# Patient Record
Sex: MALE | Race: WHITE | Employment: FULL TIME | URBAN - METROPOLITAN AREA
[De-identification: names, ages, dates, MRNs, and addresses within clinical notes are randomized per-mention and may not be internally consistent; named-entity substitution may affect disease eponyms.]

---

## 2022-04-04 ENCOUNTER — HOSPITAL ENCOUNTER (EMERGENCY)
Age: 27
Discharge: HOME | End: 2022-04-04

## 2022-04-04 ENCOUNTER — APPOINTMENT (OUTPATIENT)
Dept: GENERAL RADIOLOGY | Age: 27
End: 2022-04-04

## 2022-04-04 VITALS
RESPIRATION RATE: 19 BRPM | WEIGHT: 250 LBS | BODY MASS INDEX: 35 KG/M2 | DIASTOLIC BLOOD PRESSURE: 93 MMHG | HEIGHT: 71 IN | HEART RATE: 96 BPM | OXYGEN SATURATION: 95 % | TEMPERATURE: 99 F | SYSTOLIC BLOOD PRESSURE: 178 MMHG

## 2022-04-04 DIAGNOSIS — S92.532B OPEN DISPLACED FRACTURE OF DISTAL PHALANX OF LESSER TOE OF LEFT FOOT, INITIAL ENCOUNTER: ICD-10-CM

## 2022-04-04 DIAGNOSIS — S91.209A OPEN WOUND OF TOE WITH AVULSION OF TOENAIL, INITIAL ENCOUNTER: Primary | ICD-10-CM

## 2022-04-04 PROCEDURE — 73630 X-RAY EXAM OF FOOT: CPT

## 2022-04-04 PROCEDURE — 99283 EMERGENCY DEPT VISIT LOW MDM: CPT

## 2022-04-04 RX ORDER — LIDOCAINE HYDROCHLORIDE 10 MG/ML
5 INJECTION, SOLUTION EPIDURAL; INFILTRATION; INTRACAUDAL; PERINEURAL ONCE
Status: DISCONTINUED | OUTPATIENT
Start: 2022-04-04 | End: 2022-04-04 | Stop reason: HOSPADM

## 2022-04-04 RX ORDER — CEPHALEXIN 500 MG/1
500 CAPSULE ORAL 4 TIMES DAILY
Qty: 28 CAPSULE | Refills: 0 | Status: SHIPPED | OUTPATIENT
Start: 2022-04-04 | End: 2022-04-11

## 2022-04-04 RX ORDER — ACETAMINOPHEN AND CODEINE PHOSPHATE 300; 30 MG/1; MG/1
1 TABLET ORAL 3 TIMES DAILY PRN
Qty: 10 TABLET | Refills: 0 | Status: SHIPPED | OUTPATIENT
Start: 2022-04-04 | End: 2022-04-07

## 2022-04-04 RX ORDER — BUPIVACAINE HYDROCHLORIDE 5 MG/ML
INJECTION, SOLUTION EPIDURAL; INTRACAUDAL
Status: DISCONTINUED
Start: 2022-04-04 | End: 2022-04-04 | Stop reason: HOSPADM

## 2022-04-04 RX ORDER — BUPIVACAINE HYDROCHLORIDE 5 MG/ML
10 INJECTION, SOLUTION EPIDURAL; INTRACAUDAL ONCE
Status: DISCONTINUED | OUTPATIENT
Start: 2022-04-04 | End: 2022-04-04 | Stop reason: HOSPADM

## 2022-04-04 ASSESSMENT — ENCOUNTER SYMPTOMS
VOMITING: 0
NAUSEA: 0
DIARRHEA: 0
ABDOMINAL PAIN: 0
COUGH: 0
COLOR CHANGE: 0
SHORTNESS OF BREATH: 0
BACK PAIN: 0

## 2022-04-04 ASSESSMENT — PAIN DESCRIPTION - ONSET: ONSET: SUDDEN

## 2022-04-04 ASSESSMENT — PAIN DESCRIPTION - DESCRIPTORS: DESCRIPTORS: ACHING;SHARP

## 2022-04-04 ASSESSMENT — PAIN DESCRIPTION - LOCATION: LOCATION: FOOT

## 2022-04-04 ASSESSMENT — PAIN - FUNCTIONAL ASSESSMENT: PAIN_FUNCTIONAL_ASSESSMENT: PREVENTS OR INTERFERES SOME ACTIVE ACTIVITIES AND ADLS

## 2022-04-04 ASSESSMENT — PAIN DESCRIPTION - PAIN TYPE: TYPE: ACUTE PAIN

## 2022-04-04 ASSESSMENT — PAIN DESCRIPTION - FREQUENCY: FREQUENCY: CONTINUOUS

## 2022-04-04 ASSESSMENT — PAIN SCALES - GENERAL: PAINLEVEL_OUTOF10: 10

## 2022-04-04 ASSESSMENT — PAIN DESCRIPTION - ORIENTATION: ORIENTATION: LEFT

## 2022-04-04 ASSESSMENT — PAIN DESCRIPTION - PROGRESSION: CLINICAL_PROGRESSION: NOT CHANGED

## 2022-04-04 NOTE — ED PROVIDER NOTES
**ADVANCED PRACTICE PROVIDER, I HAVE EVALUATED THIS PATIENT**        [address] ENCOUNTER      Pt Name: Jerry Stokes  CTE:[id number]  [place] 1995  Date of evaluation: 4/4/2022  Provider: JONY Esparza - GEORGE      Chief Complaint:    Chief Complaint   Patient presents with    Toe Injury     Pt states large piece of conrete was dropped on top of left foot, 2nd and 3rd toes were smashed and have bleeding noted. Nursing Notes, Past Medical Hx, Past Surgical Hx, Social Hx, Allergies, and Family Hx were all reviewed and agreed with or any disagreements were addressed in the HPI.    HPI: (Location, Duration, Timing, Severity, Quality, Assoc Sx, Context, Modifying factors)    Chief Complaint of toe pain after dropping concrete on them at work    This is a  27 y.o. male who presents today with laceration to both his second and third toe on his left foot, states that he dropped a piece of concrete on it. Has 2 open wounds on both the second and third toe of the left foot, adjacent to both cuticles, it appears the nails are avulsed with a questionable open fracture.,  Patient complains of severe pain, rates the pain a 10 on a 10. He denies any pain in the actual foot. No numbness, no paresthesias. He does report his tetanus is up-to-date. He denies any distal complaints, no additional aggravating or leading factors. Patient presents awake, alert and in no acute respiratory distress or toxic appearance. PastMedical/Surgical History:  History reviewed. No pertinent past medical history. Procedure Laterality Date    APPENDECTOMY         Medications:  Discharge Medication List as of 4/4/2022  2:02 PM            Review of Systems:  (2-9 systems needed)  Review of Systems   Constitutional: Negative for chills and fever. HENT: Negative for congestion. Respiratory: Negative for cough and shortness of breath.     Cardiovascular: Negative for chest pain. Gastrointestinal: Negative for abdominal pain, diarrhea, nausea and vomiting. Musculoskeletal: Positive for arthralgias. Negative for back pain. Patient has what appears to be 2 open wounds on both the second and third toe of the left foot, adjacent to both cuticles, it appears the nails are avulsed with a questionable open fracture. Skin: Positive for wound. Negative for color change. Patient presents with laceration to both his second and third toe on his left foot, states that he dropped a piece of concrete on it. Neurological: Negative for weakness, numbness and headaches. \"Positives and Pertinent negatives as per HPI\"    Physical Exam:  Physical Exam  Vitals and nursing note reviewed. Constitutional:       Appearance: He is well-developed. He is not diaphoretic. HENT:      Head: Normocephalic. Right Ear: External ear normal.      Left Ear: External ear normal.   Eyes:      General:         Right eye: No discharge. Left eye: No discharge. Cardiovascular:      Rate and Rhythm: Normal rate. Pulmonary:      Effort: Pulmonary effort is normal. No respiratory distress. Breath sounds: Normal breath sounds. Musculoskeletal:         General: Tenderness present. Normal range of motion. Cervical back: Normal range of motion and neck supple. Comments: Patient has severe tenderness about the second and third toe, appears to have an avulsed toenail on both   Skin:     General: Skin is warm. Capillary Refill: Capillary refill takes less than 2 seconds. Coloration: Skin is not pale. Comments: Patient has what appears to be 2 open wounds on both the second and third toe of the left foot, adjacent to both cuticles, it appears the nails are avulsed with a questionable open fracture. Neurological:      General: No focal deficit present. Mental Status: He is alert and oriented to person, place, and time.    Psychiatric: Behavior: Behavior normal.         MEDICAL DECISION MAKING    Vitals:    Vitals:    04/04/22 1155   BP: (!) 178/93   Pulse: 96   Resp: 19   Temp: 99 °F (37.2 °C)   TempSrc: Oral   SpO2: 95%   Weight: 250 lb (113.4 kg)   Height: 5' 11\" (1.803 m)       LABS:Labs Reviewed - No data to display     Remainder of labs reviewed and were negative at this time or not returned at the time of this note. RADIOLOGY:   Non-plain film images such as CT, Ultrasound and MRI are read by the radiologist. Sylvia DEL VALLE APRN - CNP have directly visualized the radiologic plain film image(s) with the below findings:      Interpretation per the Radiologist below, if available at the time of this note:    XR FOOT LEFT (MIN 3 VIEWS)   Final Result   Traumatic, acute fractures of the 2nd and 3rd von. MEDICAL DECISION MAKING / ED COURSE:    Because of high probability of sudden clinical deterioration of the patient's condition and risk of further deterioration, critical care time required my full attention to the patient's condition; which included chart data review, documentation, medication ordering, reviewing the patient's old records, reevaluation patient's cardiac, pulmonary and neurological status. Reevaluation of vital signs. Consultations with ED attending and admitting physician. Ordering, interpreting reviewing diagnostic testing. Therefore a critical care time was 12 minutes of direct attention to the patient's condition did not include time spent on procedures. PROCEDURES:   Procedures    Laceration Repair Procedure Note    Indication: two toenail removals with concern of laceration    Procedure: The patient was placed in the appropriate position and anesthesia around the 2nd and 3rd toe obtained with a full digital block of the left second toe and third toe using 0.5% Bupivacaine without epinephrine.  The area was then cleansed with Shur-Clens and draped in a sterile fashion and irrigated with Shiraur-Clens and normal saline. However, the toenails were easily removed, I looked thoroughly and there is no open lacerations under the toenail, it appears that he avulsed both nails and directly at the cuticle. No laceration repair was required only toenail removal.    Other Items: None    The patient tolerated the procedure well. Complications: None        Patient was given:  Medications   bupivacaine (PF) (MARCAINE) 0.5 % injection 50 mg (has no administration in time range)   lidocaine PF 1 % injection 5 mL (has no administration in time range)   bupivacaine (PF) (MARCAINE) 0.5 % injection (has no administration in time range)       Patient presents with laceration to both his second and third toe on his left foot, states that he dropped a piece of concrete on it. Patient has what appears to be 2 open wounds on both the second and third toe of the left foot, adjacent to both cuticles, it appears the nails are avulsed with a questionable open fracture. After evaluation and examination patient wound care was completed, x-rays are obtained, I immediately did a digital block to the second and third toe due to the obvious open injury and avulsed toenails. X-ray shows traumatic acute fractures of the second and third tops of both toenails, comminuted with adjacent soft tissue swelling. I did perform a procedure removing both toenails however underneath there is no visible lacerations, it appears that the mechanism injury occurred adjacent to the cuticle, the toenail was removed with ease, patient was placed in postop shoe however prior Surgifoam was applied along with a dressing. His tetanus was up-to-date, I will start him on antibiotic therapy. I estimate there is LOW risk for COMPARTMENT SYNDROME, DEEP VENOUS THROMBOSIS, SEPTIC ARTHRITIS, TENDON OR NEUROVASCULAR INJURY, thus I consider the discharge disposition reasonable.       Therefore, Shared medical decision was made to the patient myself we agreed

## 2022-04-04 NOTE — ED TRIAGE NOTES
Pt states large piece of conrete was dropped on top of left foot, 2nd and 3rd toes were smashed and have bleeding noted.

## 2022-04-05 ENCOUNTER — OFFICE VISIT (OUTPATIENT)
Dept: ORTHOPEDIC SURGERY | Age: 27
End: 2022-04-05

## 2022-04-05 VITALS — BODY MASS INDEX: 35 KG/M2 | HEIGHT: 71 IN | WEIGHT: 250 LBS

## 2022-04-05 DIAGNOSIS — S92.532B OPEN DISPLACED FRACTURE OF DISTAL PHALANX OF LESSER TOE OF LEFT FOOT, INITIAL ENCOUNTER: Primary | ICD-10-CM

## 2022-04-05 PROCEDURE — 99213 OFFICE O/P EST LOW 20 MIN: CPT | Performed by: ORTHOPAEDIC SURGERY

## 2022-04-05 NOTE — PROGRESS NOTES
CHIEF COMPLAINT: Left foot pain/ second toe and third toe distal phalanx comminuted open fracture. DATE OF INJURY: 4/4/2022    HISTORY:  Mr. He Howard 27 y.o.  male presents today for the first visit for evaluation of a left foot injury which occurred when he dropped concrete on the distal end of his left foot while he was working   He is complaining of left foot second toe and third toe pain and swelling. He rates his pain a 6/10 VAS. This is better with elevation and worse with bearing any wt. The pain is sharp and not radiating. No other complaint. He was seen 1st at [facility] ER, where he was x-rayed and splinted and asked to f/u with orthopaedics. He was given antibiotics which he has not picked up yet. 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 Denies smoking. No past medical history on file. Past Surgical History:   Procedure Laterality Date    APPENDECTOMY         Social History     Socioeconomic History    Marital status: Single     Spouse name: Not on file    Number of children: Not on file    Years of education: Not on file    Highest education level: Not on file   Occupational History    Not on file   Tobacco Use    Smoking status: Never Smoker    Smokeless tobacco: Never Used   Substance and Sexual Activity    Alcohol use: Yes     Comment: occasional    Drug use: No    Sexual activity: Not Currently   Other Topics Concern    Not on file   Social History Narrative    Not on file     Social Determinants of Health     Financial Resource Strain:     Difficulty of Paying Living Expenses: Not on file   Food Insecurity:     Worried About Running Out of Food in the Last Year: Not on file    Shana of Food in the Last Year: Not on file   Transportation Needs:     Lack of Transportation (Medical): Not on file    Lack of Transportation (Non-Medical):  Not on file   Physical Activity:     Days of Exercise per Week: Not on file    Minutes of Exercise per Session: Not on file   Stress:     Feeling of Stress : Not on file   Social Connections:     Frequency of Communication with Friends and Family: Not on file    Frequency of Social Gatherings with Friends and Family: Not on file    Attends Cheondoism Services: Not on file    Active Member of Clubs or Organizations: Not on file    Attends Club or Organization Meetings: Not on file    Marital Status: Not on file   Intimate Partner Violence:     Fear of Current or Ex-Partner: Not on file    Emotionally Abused: Not on file    Physically Abused: Not on file    Sexually Abused: Not on file   Housing Stability:     Unable to Pay for Housing in the Last Year: Not on file    Number of [place] in the Last Year: Not on file    Unstable Housing in the Last Year: Not on file       No family history on file. Current Outpatient Medications on File Prior to Visit   Medication Sig Dispense Refill    cephALEXin (KEFLEX) 500 MG capsule Take 1 capsule by mouth 4 times daily for 7 days 28 capsule 0    acetaminophen-codeine (TYLENOL/CODEINE #3) 300-30 MG per tablet Take 1 tablet by mouth 3 times daily as needed for Pain for up to 3 days. 10 tablet 0     No current facility-administered medications on file prior to visit. Pertinent items are noted in HPI  Review of systems reviewed from Patient History Form and available in the patient's chart under the Media tab. No change noted. PHYSICAL EXAMINATION:  Mr. Spring Nielson is a very pleasant 27 y.o.  male who presents today in no acute distress, awake, alert, and oriented. He is well dressed, nourished and  groomed. Patient with normal affect. Height is  5' 11\" (1.803 m), weight is 250 lb (113.4 kg), Body mass index is 34.87 kg/m². Resting respiratory rate is 16. Examination of the gait, showed that the patient walks with a limp in boot, PWB left leg.   Examination of both feet and ankles showing a decreased range of motion of the left foot second toe and third toe compare to the other side because of pain. There is moderate swelling that can be seen, as well as ecchymosis and a large bloody wound over the tuft second toe and third toe of the left foot. He has intact sensation and good pedal pulses. He has significant tenderness on deep palpation over the second toe and third toe distal phalanx left foot                IMAGING: Xray's were reviewed, dated 4/4/2022, 3 views of the left foot, and showed a second toe and third toe distal phalanx comminuted fracture. IMPRESSION: Left foot second toe and third toe distal phalanx comminuted open fracture. PLAN: I discussed that the overall alignment of this fracture is good and that we can try to treat this non-operatively in a post-op shoe with full WB. He was instructed to rest and elevate. Dressing applied today and instructed in care. He was instructed to  the Keflex and complete the antibiotics until gone. He was instructed to keep the wound clean and dry. We discussed the risk of nonunion and  malunion. We will see him  back in 1 week for wound check. He is leaving for [place] on Saturday and will follow up upon his return.       Maxime Wagner MD

## 2022-04-14 ENCOUNTER — OFFICE VISIT (OUTPATIENT)
Dept: ORTHOPEDIC SURGERY | Age: 27
End: 2022-04-14

## 2022-04-14 VITALS — HEIGHT: 71 IN | WEIGHT: 250 LBS | BODY MASS INDEX: 35 KG/M2

## 2022-04-14 DIAGNOSIS — S92.532B OPEN DISPLACED FRACTURE OF DISTAL PHALANX OF LESSER TOE OF LEFT FOOT, INITIAL ENCOUNTER: Primary | ICD-10-CM

## 2022-04-14 PROCEDURE — 99213 OFFICE O/P EST LOW 20 MIN: CPT | Performed by: ORTHOPAEDIC SURGERY

## 2022-04-17 NOTE — PROGRESS NOTES
CHIEF COMPLAINT: Left foot pain/ second toe and third toe distal phalanx comminuted open fracture. DATE OF INJURY: 4/4/2022    HISTORY:  Mr. Mira Hall 27 y.o.  male presents today for f/u evaluation of a left foot injury which occurred when he dropped concrete on the distal end of his left foot while he was working   He is complaining of left foot second toe and third toe pain and swelling. He rates his pain a 8/10 VAS. This is better with elevation and worse with bearing any wt. The pain is sharp and not radiating. No other complaint. He was seen 1st at [facility] ER, where he was x-rayed and splinted and asked to f/u with orthopaedics. He was given antibiotics. 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 Denies smoking. History reviewed. No pertinent past medical history. Past Surgical History:   Procedure Laterality Date    APPENDECTOMY         Social History     Socioeconomic History    Marital status: Single     Spouse name: Not on file    Number of children: Not on file    Years of education: Not on file    Highest education level: Not on file   Occupational History    Not on file   Tobacco Use    Smoking status: Never Smoker    Smokeless tobacco: Never Used   Substance and Sexual Activity    Alcohol use: Yes     Comment: occasional    Drug use: No    Sexual activity: Not Currently   Other Topics Concern    Not on file   Social History Narrative    Not on file     Social Determinants of Health     Financial Resource Strain:     Difficulty of Paying Living Expenses: Not on file   Food Insecurity:     Worried About Running Out of Food in the Last Year: Not on file    Shana of Food in the Last Year: Not on file   Transportation Needs:     Lack of Transportation (Medical): Not on file    Lack of Transportation (Non-Medical):  Not on file   Physical Activity:     Days of Exercise per Week: Not on file    Minutes of Exercise per Session: Not on file Stress:     Feeling of Stress : Not on file   Social Connections:     Frequency of Communication with Friends and Family: Not on file    Frequency of Social Gatherings with Friends and Family: Not on file    Attends Pentecostal Services: Not on file    Active Member of Clubs or Organizations: Not on file    Attends Club or Organization Meetings: Not on file    Marital Status: Not on file   Intimate Partner Violence:     Fear of Current or Ex-Partner: Not on file    Emotionally Abused: Not on file    Physically Abused: Not on file    Sexually Abused: Not on file   Housing Stability:     Unable to Pay for Housing in the Last Year: Not on file    Number of [place] in the Last Year: Not on file    Unstable Housing in the Last Year: Not on file       History reviewed. No pertinent family history. No current outpatient medications on file prior to visit. No current facility-administered medications on file prior to visit. Pertinent items are noted in HPI  Review of systems reviewed from Patient History Form and available in the patient's chart under the Media tab. No change noted. PHYSICAL EXAMINATION:  Mr. Amy Hammond is a very pleasant 27 y.o.  male who presents today in no acute distress, awake, alert, and oriented. He is well dressed, nourished and  groomed. Patient with normal affect. Height is  5' 11\" (1.803 m), weight is 250 lb (113.4 kg), Body mass index is 34.87 kg/m². Resting respiratory rate is 16. Examination of the gait, showed that the patient walks with a limp in boot, WB left leg. Examination of both feet and ankles showing a decreased range of motion of the left foot second toe and third toe compare to the other side because of pain. There is moderate swelling that can be seen, as well as ecchymosis and a large bloody wound over the tuft second toe and third toe of the left foot. He has intact sensation and good pedal pulses.  He has significant tenderness on deep palpation over the second toe and third toe distal phalanx left foot        4/5/2022 4/14/2022          IMAGING: Loida Lawler were reviewed, dated 4/4/2022, 3 views of the left foot, and showed a second toe and third toe distal phalanx comminuted fracture. IMPRESSION: Left foot second toe and third toe distal phalanx comminuted open fracture. PLAN: I discussed that the overall alignment of this fracture is good and that we can try to treat this non-operatively in a post-op shoe with full WB. He was instructed to rest and elevate. Dressing applied today and instructed in care. He was instructed to finish the Keflex. He was instructed to keep the wound clean and dry. We discussed the risk of infection, nonunion and  malunion. We will see him  back in 2 week for wound check with new xray.       Olvin Pepe MD

## 2022-05-04 ENCOUNTER — TELEPHONE (OUTPATIENT)
Dept: ORTHOPEDIC SURGERY | Age: 27
End: 2022-05-04

## 2022-05-04 NOTE — TELEPHONE ENCOUNTER
Spoke to SMA to discuss as the injury was an open fx and a wound to the toe. SMA states that if the patient is truly not having any issues or signs of infection with him being self pay and reporting no issues, then the patient doesn't need to come back in for an appointment. However, SMA states if something would change or the toe started showing signs of infection patient must call and lets know as an appointment will likely need to be made.

## 2022-05-04 NOTE — TELEPHONE ENCOUNTER
Called and spoke patient informing him of SMA decision and explaining to him that he should call or message us through [employer] (with pictures of foot) with any questions or concerns with the foot. Explain to him the signs of infection and how to utilize [employer] to send pictures as a way of communication before making an appointment to help limit his cost of appointment. But reassured him that if we would find something through a picture or him calling back with concerns an appointment might be warranted to sure no issues or infection is taking place. He understood the benefits and risk of not coming in for an appointment and proceeding this way and had no question at this time. I send the patient an e-mail request to sign him up for [employer] at the e-mail below. [email or web address]

## 2022-05-04 NOTE — TELEPHONE ENCOUNTER
Pt thought appt was today. Pt states he is really not having any pain and would like to know if even necessary for him to come in. Pt is self pay and just concerned about cost. Pt did cancel appt for tomorrow, and will wait to hear back  Pt lives [place] hour away.   pls call  [phone] (home) [phone] (work)